# Patient Record
(demographics unavailable — no encounter records)

---

## 2024-11-22 NOTE — HISTORY OF PRESENT ILLNESS
[FreeTextEntry1] : physical examination [de-identified] : 18 y/o female presents for comprehensive physical examination. She reports hx of elevated testosterone.

## 2024-11-22 NOTE — PLAN
[FreeTextEntry1] :   #HM  -CMP, CBC w. diff, Lipid, A1C, TSH w. rFT4, UA + micro -Flu shot annually  -Tdap q10 years  -Use of sunscreen -Physical activity 3-5x/week for at least 30 minutes recommended -Healthy diet and lifestyle recommended including diet low in processed foods and saturated fats and high in vegetables and whole grains -Skin exams -Annual dental and vision exam recommended  #Elevated testosterone- DHEA, testosterone #Anemia- iron, tibc, ferritin   Labs drawn in office

## 2024-11-22 NOTE — HEALTH RISK ASSESSMENT
[No] : In the past 12 months have you used drugs other than those required for medical reasons? No [No falls in past year] : Patient reported no falls in the past year [0] : 2) Feeling down, depressed, or hopeless: Not at all (0) [PHQ-2 Negative - No further assessment needed] : PHQ-2 Negative - No further assessment needed [[id number]] : 0 [Change in mental status noted] : No change in mental status noted [None] : None [Fully functional (bathing, dressing, toileting, transferring, walking, feeding)] : Fully functional (bathing, dressing, toileting, transferring, walking, feeding) [Fully functional (using the telephone, shopping, preparing meals, housekeeping, doing laundry, using] : Fully functional and needs no help or supervision to perform IADLs (using the telephone, shopping, preparing meals, housekeeping, doing laundry, using transportation, managing medications and managing finances) [Never] : Never

## 2024-11-22 NOTE — HISTORY OF PRESENT ILLNESS
[FreeTextEntry1] : physical examination [de-identified] : 20 y/o female presents for comprehensive physical examination. She reports hx of elevated testosterone.

## 2024-11-22 NOTE — HEALTH RISK ASSESSMENT
[No] : In the past 12 months have you used drugs other than those required for medical reasons? No [No falls in past year] : Patient reported no falls in the past year [0] : 2) Feeling down, depressed, or hopeless: Not at all (0) [PHQ-2 Negative - No further assessment needed] : PHQ-2 Negative - No further assessment needed [[id number]] : 0 [Change in mental status noted] : No change in mental status noted [None] : None [Fully functional (bathing, dressing, toileting, transferring, walking, feeding)] : Fully functional (bathing, dressing, toileting, transferring, walking, feeding) [Fully functional (using the telephone, shopping, preparing meals, housekeeping, doing laundry, using] : Fully functional and needs no help or supervision to perform IADLs (using the telephone, shopping, preparing meals, housekeeping, doing laundry, using transportation, managing medications and managing finances) [Never] : Never

## 2025-05-06 NOTE — PLAN
[FreeTextEntry1] :   #Hair loss -CBC w. diff, iron + tibc, ferritin, vitamin D, vitamin b12, TFTs, CAROL, anti thyroid ab, ESR, CRP -Derm consult if no improvement   Labs drawn in office

## 2025-05-06 NOTE — HISTORY OF PRESENT ILLNESS
[FreeTextEntry8] : 20 y/o female presents with c/o chronic hair loss. Hair thinning mostly on frontal scalp and also on sparse regions of her arms and legs. No joint pain. Pt states she consulted with dermatologist in the past and was given topical treatment.

## 2025-05-06 NOTE — PHYSICAL EXAM
[No Acute Distress] : no acute distress [Well Developed] : well developed [Well-Appearing] : well-appearing [Normal Sclera/Conjunctiva] : normal sclera/conjunctiva [EOMI] : extraocular movements intact [No Lymphadenopathy] : no lymphadenopathy [Supple] : supple [No Respiratory Distress] : no respiratory distress  [No Accessory Muscle Use] : no accessory muscle use [Clear to Auscultation] : lungs were clear to auscultation bilaterally [Normal Rate] : normal rate  [Regular Rhythm] : with a regular rhythm [Normal S1, S2] : normal S1 and S2 [Grossly Normal Strength/Tone] : grossly normal strength/tone [No Focal Deficits] : no focal deficits [Normal Affect] : the affect was normal [Normal Insight/Judgement] : insight and judgment were intact [de-identified] : hair thinning

## 2025-05-13 NOTE — ASSESSMENT
[FreeTextEntry1] :  #Alopecia NOS - diffuse hair thinning, most promient at crown, >20% miniaturization but with some small regrowing hairs - using rogaine 5% without improvement - lots of fringe notable but not growing long in front - 4mm punch biopsy today, site: left superior parietal - r/o AGA, alopecia areata incognito, chronic telogen effluvium - PPP

## 2025-05-13 NOTE — HISTORY OF PRESENT ILLNESS
[FreeTextEntry1] : NPV-Hair loss [de-identified] : Kia Andrews 18 y/o F presents for hair thinning

## 2025-05-22 NOTE — HISTORY OF PRESENT ILLNESS
[de-identified] : #Hair Loss - suture removed today. site well healed. - due to EMR connectivity issues results not avilable during visit - reviewed after visit and c/w AGA however given the young age, I would like to ensure there is no underlying endocrinopathy  - will order SHBG, free testosterone and refer to endocrine for endocrine evaluation  f/u 2 weeks after endocrine visit

## 2025-07-28 NOTE — HISTORY OF PRESENT ILLNESS
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 No medications were started, was sent to endocrine for further evaluation  Reports hirsutism, denes family history NO hx of irregular periods or acne  Has hx of iron deficiency anemia, not currently taking iron. Started on multivitamin 2 years ago.  Noted to have low ferritin in blood work over the past 2 years      LIFESTYLE FACTORS:   Eating patterns and weight history: Reports generally balance diet, no history of weight loss or gain   Activity/Sleep: denies sleep distubances          Follow up care: PCP: Jamia Walls        Surgical History: none    Family History: DM- Thyroid- Autoimmune- Cancer- Other- mother with anemia    Social History: occupation-  support system- ETOH use- none Tobacco Hx- none Additional substance use-   Additional Medications: OTC vitamins and supplements: multivitamin - started 2 months ago

## 2025-07-28 NOTE — ASSESSMENT
[FreeTextEntry1] : Alopecia: Androgenic alopecia Repeat Testosterone between 8-9am fasting will check additional adrenal hormones Currently on topical minoxidil with minimal relief Will confirm hyperandrogenism prior to suggesting spironolactone therapy